# Patient Record
(demographics unavailable — no encounter records)

---

## 2024-11-19 NOTE — PLAN
[FreeTextEntry1] : 73 year old female w/ pmh of HLD, osteooperosis, presents for annual visit.  She has no complaints today.  She feels well. Was diagnosed with hld last year . She has not been following any particular diet since that time.    Annual visit  >>up to date with her mammo, dexa , colonoscopy, pap >> blood work and EKG reviewed  HLD >>elevated LDL persistent; has not been following any particular diet.  >>pateint wants to hear reccomendations from her Primary doctor which is Dr. Henriquez.  HTN >>uncontrolled HTN >>advised patient to either increase her amlopdine or  start Dual therapy anti-hypertensive regimen. >>Patient wants  to hear reccomendations from her primary doctor    Will defer follow-up  to Dr. Henriquez whom will decide when patient will follow up for repeat lipid panel and hypertension.

## 2024-11-19 NOTE — HISTORY OF PRESENT ILLNESS
[FreeTextEntry1] : annual visit [de-identified] : 73 year old female w/ pmh of HLD, osteooperosis, presents for annual visit.  She has no complaints today.  She feels well. Was diagnosed with hld last year . She has not been following any particular diet since that time.  ROS is negative.

## 2024-11-19 NOTE — HISTORY OF PRESENT ILLNESS
[FreeTextEntry1] : annual visit [de-identified] : 73 year old female w/ pmh of HLD, osteooperosis, presents for annual visit.  She has no complaints today.  She feels well. Was diagnosed with hld last year . She has not been following any particular diet since that time.  ROS is negative.

## 2024-11-19 NOTE — HEALTH RISK ASSESSMENT
[Very Good] : ~his/her~ current health as very good [Excellent] : ~his/her~  mood as  excellent [Yes] : Yes [2 - 4 times a month (2 pts)] : 2-4 times a month (2 points) [1 or 2 (0 pts)] : 1 or 2 (0 points) [Never (0 pts)] : Never (0 points) [No] : In the past 12 months have you used drugs other than those required for medical reasons? No [No falls in past year] : Patient reported no falls in the past year [0] : 2) Feeling down, depressed, or hopeless: Not at all (0) [PHQ-2 Negative - No further assessment needed] : PHQ-2 Negative - No further assessment needed [PHQ-9 Negative - No further assessment needed] : PHQ-9 Negative - No further assessment needed [Never] : Never [HIV test declined] : HIV test declined [Hepatitis C test declined] : Hepatitis C test declined [None] : None [With Significant Other] : lives with significant other [Retired] : retired [] :  [# Of Children ___] : has [unfilled] children [Sexually Active] : sexually active [Feels Safe at Home] : Feels safe at home [Fully functional (bathing, dressing, toileting, transferring, walking, feeding)] : Fully functional (bathing, dressing, toileting, transferring, walking, feeding) [Fully functional (using the telephone, shopping, preparing meals, housekeeping, doing laundry, using] : Fully functional and needs no help or supervision to perform IADLs (using the telephone, shopping, preparing meals, housekeeping, doing laundry, using transportation, managing medications and managing finances) [Reports normal functional visual acuity (ie: able to read med bottle)] : Reports normal functional visual acuity [Smoke Detector] : smoke detector [Carbon Monoxide Detector] : carbon monoxide detector [Seat Belt] :  uses seat belt [Sunscreen] : uses sunscreen [Graduate School] : graduate school [Audit-CScore] : 2 [de-identified] : tennis   [de-identified] : no particular diet; oatmeal breakfast, salad, pizza, sandwichs, dinner chicken roasted , bakedw/ rice , pasta, vegetables roasted [AZO2Ilccp] : 0 [Change in mental status noted] : No change in mental status noted [Language] : denies difficulty with language [Behavior] : denies difficulty with behavior [Learning/Retaining New Information] : denies difficulty learning/retaining new information [Handling Complex Tasks] : denies difficulty handling complex tasks [Reasoning] : denies difficulty with reasoning [Spatial Ability and Orientation] : denies difficulty with spatial ability and orientation [High Risk Behavior] : no high risk behavior [Reports changes in hearing] : Reports no changes in hearing [Reports changes in vision] : Reports no changes in vision [Reports changes in dental health] : Reports no changes in dental health [MammogramDate] : 11/2024 [PapSmearDate] : 10/2024 [PapSmearComments] : no pap smear at ob [BoneDensityDate] : 2024 [ColonoscopyDate] : 2000 [FreeTextEntry2] : teacher, [employer]

## 2024-11-19 NOTE — REVIEW OF SYSTEMS
[Patient Intake Form Reviewed] : Patient intake form was reviewed [FreeTextEntry1] : ros negative other then what is in HPI

## 2024-11-19 NOTE — HEALTH RISK ASSESSMENT
[Very Good] : ~his/her~ current health as very good [Excellent] : ~his/her~  mood as  excellent [Yes] : Yes [2 - 4 times a month (2 pts)] : 2-4 times a month (2 points) [1 or 2 (0 pts)] : 1 or 2 (0 points) [Never (0 pts)] : Never (0 points) [No] : In the past 12 months have you used drugs other than those required for medical reasons? No [No falls in past year] : Patient reported no falls in the past year [0] : 2) Feeling down, depressed, or hopeless: Not at all (0) [PHQ-2 Negative - No further assessment needed] : PHQ-2 Negative - No further assessment needed [PHQ-9 Negative - No further assessment needed] : PHQ-9 Negative - No further assessment needed [Never] : Never [HIV test declined] : HIV test declined [Hepatitis C test declined] : Hepatitis C test declined [None] : None [With Significant Other] : lives with significant other [Retired] : retired [] :  [# Of Children ___] : has [unfilled] children [Sexually Active] : sexually active [Feels Safe at Home] : Feels safe at home [Fully functional (bathing, dressing, toileting, transferring, walking, feeding)] : Fully functional (bathing, dressing, toileting, transferring, walking, feeding) [Fully functional (using the telephone, shopping, preparing meals, housekeeping, doing laundry, using] : Fully functional and needs no help or supervision to perform IADLs (using the telephone, shopping, preparing meals, housekeeping, doing laundry, using transportation, managing medications and managing finances) [Reports normal functional visual acuity (ie: able to read med bottle)] : Reports normal functional visual acuity [Smoke Detector] : smoke detector [Carbon Monoxide Detector] : carbon monoxide detector [Seat Belt] :  uses seat belt [Sunscreen] : uses sunscreen [Graduate School] : graduate school [Audit-CScore] : 2 [de-identified] : tennis   [de-identified] : no particular diet; oatmeal breakfast, salad, pizza, sandwichs, dinner chicken roasted , bakedw/ rice , pasta, vegetables roasted [[id number]] : 0 [Change in mental status noted] : No change in mental status noted [Language] : denies difficulty with language [Behavior] : denies difficulty with behavior [Learning/Retaining New Information] : denies difficulty learning/retaining new information [Handling Complex Tasks] : denies difficulty handling complex tasks [Reasoning] : denies difficulty with reasoning [Spatial Ability and Orientation] : denies difficulty with spatial ability and orientation [High Risk Behavior] : no high risk behavior [Reports changes in hearing] : Reports no changes in hearing [Reports changes in vision] : Reports no changes in vision [Reports changes in dental health] : Reports no changes in dental health [MammogramDate] : 11/2024 [PapSmearDate] : 10/2024 [PapSmearComments] : no pap smear at ob [BoneDensityDate] : 2024 [ColonoscopyDate] : 2000 [FreeTextEntry2] : teacher, [employer]

## 2024-11-19 NOTE — PHYSICAL EXAM
[TextEntry] :  HEENT:   pupils equal and reactive, EOMI, no oropharyngeal lesions, erythema, exudates, oral thrush  NECK:   supple, no carotid bruits, no palpable lymph nodes, no thyromegaly  CV:  +S1, +S2, regular, no murmurs or rubs  RESP:   lungs clear to auscultation bilaterally, no wheezing, rales, rhonchi, good air entry bilaterally  BREAST:  not examined  GI:  abdomen soft, non-tender, non-distended, normal BS, no bruits, no abdominal masses, no palpable masses  RECTAL:  not examined  :  not examined  MSK:   normal muscle tone, no atrophy, no rigidity, no contractions  EXT:   no clubbing, no cyanosis, no edema, no calf pain, swelling or erythema  VASCULAR:  pulses equal and symmetric in the upper and lower extremities  NEURO:  AAOX3, no focal neurological deficits, follows all commands, able to move extremities spontaneously  SKIN:  no ulcers, lesions or rashes

## 2025-04-25 NOTE — PHYSICAL EXAM
[Antalgic] : antalgic [LE] : Sensory: Intact in bilateral lower extremities [DP] : dorsalis pedis 2+ and symmetric bilaterally [Normal RLE] : Right Lower Extremity: No scars, rashes, lesions, ulcers, skin intact [Normal LLE] : Left Lower Extremity: No scars, rashes, lesions, ulcers, skin intact [Normal Touch] : sensation intact for touch [Normal] : no peripheral adenopathy appreciated [de-identified] : On physical exam of the left hip skin is warm and dry without erythema ecchymosis signs or symptoms of infection superficial or deep.  There is tenderness noted at the groin.  Range of motion is 90 degrees of flexion, 10 degrees of external rotation with pain, 5 degrees of internal rotation.  Strength is maintained in all planes.  Sensation is intact throughout the distal lower extremity.  There are 2+ dorsalis pedis pulse.  Leg lengths appear 1 cm shorter on the left than the right when measured at the medial malleolus. Negative Maria C's exam  Left Knee: Skin is clean, dry, intact. Swelling: No significant swelling Effusion: No significant effusion Alignment: Neutral alignment Tenderness: Medial joint line Incision: No visible incisions Skin Temp: Warm to touch ROM: Flexion: 135 deg.; Extension: 0 deg, Laxity A-P: Negative anteroposterior drawer Laxity M-L: Less than 5 degree laxity varus valgus stresses PF crepitus: None Sensation intact throughout the distal lower extremity Pulses: 2+ dorsalis pedis pulses Quad/Ham St: 5/5 [de-identified] : Two radiographs of the pelvis and left hip were performed today in the [place] office. KL grade 4 bone-on-bone cartilaginous wear in the F- A joint. Subchondral sclerosis noted on both sides the F-A  joint. Superior and inferior rim osteophytes are noted.   3 views, AP, lateral, and sunrise radiographs of the Right knee were performed today in the [place] office. KL grade 3 cartilaginous wear in medial compartment.

## 2025-04-25 NOTE — PHYSICAL EXAM
[Antalgic] : antalgic [LE] : Sensory: Intact in bilateral lower extremities [DP] : dorsalis pedis 2+ and symmetric bilaterally [Normal RLE] : Right Lower Extremity: No scars, rashes, lesions, ulcers, skin intact [Normal LLE] : Left Lower Extremity: No scars, rashes, lesions, ulcers, skin intact [Normal Touch] : sensation intact for touch [Normal] : no peripheral adenopathy appreciated [de-identified] : On physical exam of the left hip skin is warm and dry without erythema ecchymosis signs or symptoms of infection superficial or deep.  There is tenderness noted at the groin.  Range of motion is 90 degrees of flexion, 10 degrees of external rotation with pain, 5 degrees of internal rotation.  Strength is maintained in all planes.  Sensation is intact throughout the distal lower extremity.  There are 2+ dorsalis pedis pulse.  Leg lengths appear 1 cm shorter on the left than the right when measured at the medial malleolus. Negative Maria C's exam  Left Knee: Skin is clean, dry, intact. Swelling: No significant swelling Effusion: No significant effusion Alignment: Neutral alignment Tenderness: Medial joint line Incision: No visible incisions Skin Temp: Warm to touch ROM: Flexion: 135 deg.; Extension: 0 deg, Laxity A-P: Negative anteroposterior drawer Laxity M-L: Less than 5 degree laxity varus valgus stresses PF crepitus: None Sensation intact throughout the distal lower extremity Pulses: 2+ dorsalis pedis pulses Quad/Ham St: 5/5 [de-identified] : Two radiographs of the pelvis and left hip were performed today in the [place] office. KL grade 4 bone-on-bone cartilaginous wear in the F- A joint. Subchondral sclerosis noted on both sides the F-A  joint. Superior and inferior rim osteophytes are noted.   3 views, AP, lateral, and sunrise radiographs of the Right knee were performed today in the [place] office. KL grade 3 cartilaginous wear in medial compartment.

## 2025-04-25 NOTE — HISTORY OF PRESENT ILLNESS
[Worsening] : worsening [6] : a current pain level of 6/10 [10] : a maximum pain level of 10/10 [Standing] : standing [Intermit.] : ~He/She~ states the symptoms seem to be intermittent [Bending] : worsened by bending [Hip Movement] : worsened by hip movement [Walking] : worsened by walking [de-identified] : 74-year-old female presents for follow-up of the left hip and the right knee.  She states she is continuing to have pain in both the left hip and the right knee however her left hip is worsening and causing a lot of discomfort.  She is no longer able to participate in activities and she has difficulty with putting her socks and shoes on.  Pain can be rated anywhere between a 6-10 out of 10 depending on her activities.  She has had 2 ultrasound-guided intra articular injections into the left hip without any relief.  In regards to the right knee she is having some discomfort and has tried physical therapy and multiple rounds of cortisone as well as gel injections in the past 2 years by another orthopedist with no relief in symptoms.  She states she does feel as though the pain that she is feeling on the right is exacerbated by how painful her left hip is because she is putting more pressure on the right side.  She did have an MRI in 2024 which showed some mild arthritis with a new tear in her medial meniscus. Denies fevers, chills, chest pain, calf pain, shortness of breath.

## 2025-04-25 NOTE — END OF VISIT
[FreeTextEntry3] : I, Dr. Haynes, personally performed the evaluation and management (E/M) services for this established patient who presents today with an exacerbation of an existing condition. That E/M includes conducting the clinically appropriate interval history &/or exam, assessing all new/exacerbated conditions, and establishing a new plan of care. Today, my PAULETTE, Lux Markham PA-C, was here to observe my evaluation and management service for this new problem/exacerbated condition and follow the plan of care established by me going forward.

## 2025-04-25 NOTE — HISTORY OF PRESENT ILLNESS
[Worsening] : worsening [6] : a current pain level of 6/10 [10] : a maximum pain level of 10/10 [Standing] : standing [Intermit.] : ~He/She~ states the symptoms seem to be intermittent [Bending] : worsened by bending [Hip Movement] : worsened by hip movement [Walking] : worsened by walking [de-identified] : 74-year-old female presents for follow-up of the left hip and the right knee.  She states she is continuing to have pain in both the left hip and the right knee however her left hip is worsening and causing a lot of discomfort.  She is no longer able to participate in activities and she has difficulty with putting her socks and shoes on.  Pain can be rated anywhere between a 6-10 out of 10 depending on her activities.  She has had 2 ultrasound-guided intra articular injections into the left hip without any relief.  In regards to the right knee she is having some discomfort and has tried physical therapy and multiple rounds of cortisone as well as gel injections in the past 2 years by another orthopedist with no relief in symptoms.  She states she does feel as though the pain that she is feeling on the right is exacerbated by how painful her left hip is because she is putting more pressure on the right side.  She did have an MRI in 2024 which showed some mild arthritis with a new tear in her medial meniscus. Denies fevers, chills, chest pain, calf pain, shortness of breath.

## 2025-04-25 NOTE — DISCUSSION/SUMMARY
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 Based upon the patients continued symptoms and failure to respond to 3 months of conservative treatment I have recommended a Left Total Hip Replacement for this patient. A long discussion took place with the patient describing what a total joint replacement is and what the procedure would entail. A Total Hip model, similar to the implant that will be used during the operation was utilized to demonstrate and to discuss the various bearing surfaces of the implants.   The benefits of surgery were discussed with the patient including the potential for improving their current clinical condition through operative intervention. Alternatives to surgical intervention including continued conservative management were also discussed in detail.   The hospitalization and post-operative care and rehabilitation were also discussed. The use of perioperative antibiotics and DVT prophylaxis were discussed. The risk, benefits and alternatives to a surgical intervention were discussed at length with the patient. The patient was also advised of risks related to the medical comorbidities and elevated body mass index (BMI). A lengthy discussion took place to review the most common complications including but not limited to: deep vein thrombosis, pulmonary embolus, heart attack, stroke, infection, wound breakdown, numbness, damage to nerves, tendon, muscles, arteries or other blood vessels, death and other possible complications from anesthesia. The patient was told that we will take steps to minimize these risks by using sterile technique, antibiotics and DVT prophylaxis when appropriate and follow the patient postoperatively in the office setting to monitor progress. The possibility of recurrent pain, no improvement in pain and actual worsening of pain were also discussed with the patient.   The nuances between posterior approach and anterior approach THR were reviewed in detail. Considering his clinical exam and X-Ray findings he does meet inclusion criteria for benefiting from ASI or Anterior Approach THR. The patient was advised that this is Dr. Haynes's primary surgical approach for THR unless contraindicated by above. Specific ASI complications especially vascular injury, neurologic injury-lateral femoral cutaneous nerve, and proximal femur fracture were reviewed in detail.   The need for 4 weeks of Anterior THR dislocation precautions with activity and the risk of THR implant dislocation at any point Post-Op was reviewed in detail.   The patient was advised of the goals, alternatives, risks and benefits of a 3 week course of Celebrex 200 mg BID utilized by Dr. Haynes in their practice to prevent Heterotopic Ossification seen in patients post total hip arthroplasty. If this is medically contraindicated the alternative would be a single dose (800cGy) radiation treatment to the hip implant site performed pre-operatively. A consultation with the Radiation Oncologist at [facility] will then be required.   The discharge plan of care focused on the patient going home following surgery. I encouraged the patient to make the necessary arrangements to have someone stay with them when they are discharged home. Following discharge, a home care nurse will visit the patient. They will open your home care case and request home physical therapy services. Home physical therapy   will commence following discharge provided it is appropriate and covered by their health insurance benefit plan.   All questions were answered to the satisfaction of the patient. The treatment plan of care, as well as a model of a total Hip equivalent to the one that will be used for their total joint replacement, was shared with the patient.   A DJO implant with Ceramic on Poly bearing surfaces is the implant I feel comfortable utilizing in my Primary THRs and the implant I am planning for their THR. If the patient wishes to utilize a different implant brand/type for their THR, they may obtain an additional surgical opinion from a Surgeon utilizing that brand implant.   The patient agreed to the plan of care as well as the use of implants in their total joint replacement.   The patient was advised that they will require a medical preoperative risk evaluation by their PCP. Further medical subspecialty clearances such as cardiac may be indicated if felt needed by their PCP.   The patient participated in an interactive discussion of the THR implant planned for their surgery with questions answered, agreed with the treatment plan, and has decided to move forward with elective THR as planned.

## 2025-04-25 NOTE — REASON FOR VISIT
[Follow-Up Visit] : a follow-up visit for [Family Member] : family member [FreeTextEntry2] : left hip arthritis

## 2025-04-25 NOTE — DISCUSSION/SUMMARY
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 Based upon the patients continued symptoms and failure to respond to 3 months of conservative treatment I have recommended a Left Total Hip Replacement for this patient. A long discussion took place with the patient describing what a total joint replacement is and what the procedure would entail. A Total Hip model, similar to the implant that will be used during the operation was utilized to demonstrate and to discuss the various bearing surfaces of the implants.   The benefits of surgery were discussed with the patient including the potential for improving their current clinical condition through operative intervention. Alternatives to surgical intervention including continued conservative management were also discussed in detail.   The hospitalization and post-operative care and rehabilitation were also discussed. The use of perioperative antibiotics and DVT prophylaxis were discussed. The risk, benefits and alternatives to a surgical intervention were discussed at length with the patient. The patient was also advised of risks related to the medical comorbidities and elevated body mass index (BMI). A lengthy discussion took place to review the most common complications including but not limited to: deep vein thrombosis, pulmonary embolus, heart attack, stroke, infection, wound breakdown, numbness, damage to nerves, tendon, muscles, arteries or other blood vessels, death and other possible complications from anesthesia. The patient was told that we will take steps to minimize these risks by using sterile technique, antibiotics and DVT prophylaxis when appropriate and follow the patient postoperatively in the office setting to monitor progress. The possibility of recurrent pain, no improvement in pain and actual worsening of pain were also discussed with the patient.   The nuances between posterior approach and anterior approach THR were reviewed in detail. Considering his clinical exam and X-Ray findings he does meet inclusion criteria for benefiting from ASI or Anterior Approach THR. The patient was advised that this is Dr. Haynes's primary surgical approach for THR unless contraindicated by above. Specific ASI complications especially vascular injury, neurologic injury-lateral femoral cutaneous nerve, and proximal femur fracture were reviewed in detail.   The need for 4 weeks of Anterior THR dislocation precautions with activity and the risk of THR implant dislocation at any point Post-Op was reviewed in detail.   The patient was advised of the goals, alternatives, risks and benefits of a 3 week course of Celebrex 200 mg BID utilized by Dr. Haynes in their practice to prevent Heterotopic Ossification seen in patients post total hip arthroplasty. If this is medically contraindicated the alternative would be a single dose (800cGy) radiation treatment to the hip implant site performed pre-operatively. A consultation with the Radiation Oncologist at [facility] will then be required.   The discharge plan of care focused on the patient going home following surgery. I encouraged the patient to make the necessary arrangements to have someone stay with them when they are discharged home. Following discharge, a home care nurse will visit the patient. They will open your home care case and request home physical therapy services. Home physical therapy   will commence following discharge provided it is appropriate and covered by their health insurance benefit plan.   All questions were answered to the satisfaction of the patient. The treatment plan of care, as well as a model of a total Hip equivalent to the one that will be used for their total joint replacement, was shared with the patient.   A DJO implant with Ceramic on Poly bearing surfaces is the implant I feel comfortable utilizing in my Primary THRs and the implant I am planning for their THR. If the patient wishes to utilize a different implant brand/type for their THR, they may obtain an additional surgical opinion from a Surgeon utilizing that brand implant.   The patient agreed to the plan of care as well as the use of implants in their total joint replacement.   The patient was advised that they will require a medical preoperative risk evaluation by their PCP. Further medical subspecialty clearances such as cardiac may be indicated if felt needed by their PCP.   The patient participated in an interactive discussion of the THR implant planned for their surgery with questions answered, agreed with the treatment plan, and has decided to move forward with elective THR as planned.

## 2025-05-15 NOTE — PHYSICAL EXAM
[de-identified] : Patient is well nourished, well-developed, in no acute distress, with appropriate mood and affect. The patient is oriented to time, place, and person. Gait evaluation reveals a limp. There is no inguinal adenopathy. Examination of the contralateral hip shows normal range of motion, strength, no tenderness, and intact skin. The affected limb is well-perfused, shows a grossly normal motor and sensory examination. Examination of the hip shows no skin lesions. Hip motion is reduced and causes pain. Leg lengths are approximately short on the left by 1cm. Stinchfield test is positive. Both hips are stable and muscle strength is normal. Pedal pulses are palpable.  [de-identified] : AP and lateral x-rays of the left hip, pelvis, and femur were brought in by the patient which i reviewed and demonstrate degenerative joint disease of the hip with joint space narrowing, osteophyte formation, and subchondral sclerosis.

## 2025-05-15 NOTE — HISTORY OF PRESENT ILLNESS
[de-identified] : This is very nice 74 year-old woman experiencing left hip and groin and thigh pain, which is severe in intensity. She has known left hip osteoarthritis. Here for a 2nd opinion. The pain substantially limits activities of daily living. Walking tolerance is reduced. Medication including L hip CSI and PT and activity modification have been minimally effective for a period lasting greater than three months in duration. Assistive devices and external support were not deemed by the patient to be helpful in improving their function. Due to the severity of osteoarthritis and level of pain, physical therapy is contraindicated. Pain and restriction of function are intolerable at this time.

## 2025-05-15 NOTE — DISCUSSION/SUMMARY
[de-identified] : This patient has severe left hip osteoarthritis. She has tried and failed a course of conservative management and would like to proceed with a direct anterior approach left total hip arthroplasty.    The patient is an appropriate candidate for consideration of left total knee replacement. An extensive discussion was conducted of the natural history of the disease and the variety of surgical and non-surgical treatment options available to the patient. A risk/benefit analysis was discussed with the patient reviewing the advantages and disadvantages of surgical intervention at this time. Both the level and length of the patient's pain have made additional conservative treatment measures consisting of NSAIDs, physical therapy, corticosteroids, and/or viscosupplementation contraindicated. A full explanation was given of the nature and the purpose of the procedure and anesthesia, its benefits, possible alternative methods of diagnosis or treatment, the risks involved, the possibility of complications, the foreseeable consequences of the procedure and the possible results of the non-treatment. I reviewed the plan of care as well as used a model of a total knee implant equivalent to the one that will be used for their total knee joint replacement. The ability to secure the implant utilizing cement or cementless (press-fit) was discussed with the patient. The patient agrees with the plan of care, as well as the use of implants for their total knee replacement.   We also discussed that if robotic/computer navigation is utilized, then additional incisions may need to be made to accommodate the computer navigation arrays, which will be placed in the femur and tibia.  No guarantee or assurance was made as to the results that may be obtained. Specifically, the risks were identified to include, but are not limited to the following: Infection, phlebitis, pulmonary embolism, death, paralysis, dislocation, pain, stiffness, instability, limp, weakness, breakage, leg-length inequality, uncontrolled bleeding, nerve injury, blood vessel injury, pressure sores, anesthetic risks, delayed healing of wound and bone, and wear and loosening. Further discussion was undertaken with the patient about the details of surgical preparation, treatment, and postoperative rehabilitation including medical clearance, autotransfusion, the hospital course, and the postoperative rehabilitation involved. All in all, I feel that this patient is a good candidate for surgical reconstruction.  The patient and I discussed the option for 23 hour stay joint replacement. They will stay overnight in the hospital after surgery and will discharge home on the next day of surgery. The risks and benefits of this type of rapid recovery protocol was reviewed with the patient and they are in agreement with proceeding with 23 hour stay joint replacement surgery. They understand that in the event of an emergency, that they will be transferred to the main hospital for inpatient care.

## 2025-06-13 NOTE — HISTORY OF PRESENT ILLNESS
[FreeTextEntry1] : Cough upper respiratory infection symptoms [de-identified] : 74-year-old female presents to clinic for 3 days of upper respiratory infection symptoms, cough.  She also has congestion in her throat sinuses.  Patient has been taking care of her grandchildren.  Monday she felt some throat pain, went to her ENT who evaluated her and the exam at that time was benign.  Wednesday morning she started to develop upper respiratory infection symptoms throat pain some mild fatigue.  No fevers or chills.  Cough is mainly dry.

## 2025-06-20 NOTE — PHYSICAL EXAM
[Alert] : alert [Well Nourished] : well nourished [No Acute Distress] : no acute distress [Well Developed] : well developed [Normal Sclera/Conjunctiva] : normal sclera/conjunctiva [EOMI] : extra ocular movement intact [No Proptosis] : no proptosis [Thyroid Not Enlarged] : the thyroid was not enlarged [No Thyroid Nodules] : no palpable thyroid nodules [Clear to Auscultation] : lungs were clear to auscultation bilaterally [Normal S1, S2] : normal S1 and S2 [Normal Rate] : heart rate was normal [Regular Rhythm] : with a regular rhythm [No Edema] : no peripheral edema [Normal Bowel Sounds] : normal bowel sounds [Not Tender] : non-tender [Not Distended] : not distended [Soft] : abdomen soft [Normal Anterior Cervical Nodes] : no anterior cervical lymphadenopathy [No Spinal Tenderness] : no spinal tenderness [Kyphosis] : kyphosis present [No Stigmata of Cushings Syndrome] : no stigmata of Cushings Syndrome [Normal Gait] : normal gait [Normal Reflexes] : deep tendon reflexes were 2+ and symmetric [No Tremors] : no tremors [Oriented x3] : oriented to person, place, and time [No Murmurs] : no murmurs [de-identified] : 1 finger width to pelvic height

## 2025-06-20 NOTE — HISTORY OF PRESENT ILLNESS
[FreeTextEntry1] :  Pt returns for a follow-up visit for osteoporosis. No interval health changes. No major surgeries, hospitalizations, fractures or changes in medication. Up to date with dentist. Has TMJ, getting mold. No major dental work planned.  Still reports arthritic pain in right knee and left hip, will follow up with surgeon. Patient reports worsening kyphosis, however height is stable. Planned for Left Hip replacement 9/3/25 with Dane Haynes MD  Told of low bone density approximately 10 years ago. She took Actonel for 8 years and Reclast for 2 years. No interval fx.  BMD 01/2017 indicated decrease in the spine while on drug holiday for 4 years (prior dose of Reclast was January 2013).  Had Reclast 3/2017. Tolerated well no fx. BMD 2018 indicated decrease in proximal radius. Began Prolia Feb 2018. Tolerating well. No thigh pain, no interval fx. Repeat bone density 2019 improved.Repeat bone density 2025 improved in the spine   Had gel injections into the knees. Drinks alcohol socially (one drink 3x/month). No history of steroid use. No history of hyper or hypothyroidism

## 2025-06-20 NOTE — PHYSICAL EXAM
[Alert] : alert [Well Nourished] : well nourished [No Acute Distress] : no acute distress [Well Developed] : well developed [Normal Sclera/Conjunctiva] : normal sclera/conjunctiva [EOMI] : extra ocular movement intact [No Proptosis] : no proptosis [Thyroid Not Enlarged] : the thyroid was not enlarged [No Thyroid Nodules] : no palpable thyroid nodules [Clear to Auscultation] : lungs were clear to auscultation bilaterally [Normal S1, S2] : normal S1 and S2 [Normal Rate] : heart rate was normal [Regular Rhythm] : with a regular rhythm [No Edema] : no peripheral edema [Normal Bowel Sounds] : normal bowel sounds [Not Tender] : non-tender [Not Distended] : not distended [Soft] : abdomen soft [Normal Anterior Cervical Nodes] : no anterior cervical lymphadenopathy [No Spinal Tenderness] : no spinal tenderness [Kyphosis] : kyphosis present [No Stigmata of Cushings Syndrome] : no stigmata of Cushings Syndrome [Normal Gait] : normal gait [Normal Reflexes] : deep tendon reflexes were 2+ and symmetric [No Tremors] : no tremors [Oriented x3] : oriented to person, place, and time [No Murmurs] : no murmurs [de-identified] : 1 finger width to pelvic height

## 2025-06-20 NOTE — PROCEDURE
[FreeTextEntry1] : Bone Mineral Density: 06/19/2025 Indication: vs 2023 to assess response to medication Spine:  -1.8  osteopenia  ( +11.3 %), significant change Total hip:  -1.5   ( 0%), no significant change Femoral neck:  normal  -0.6  ( 0%), no significant change Right Total hip:  osteopenia -1.5   ( -0.6%), significant change Femoral neck: osteopenia  -1.7  ( -0.9%), no significant change Proximal radius: -2.0  osteopenia (  0%), no significant change TBS 1.246 partially degraded   Bone Mineral Density: 06/14/2023  Indication: Compared to 2021 Spine -2.3 osteopenia no significant change Total hip -1.5 osteopenia no significant change Femoral neck -0.6 normal +3.0% possibly due to arthritis femoral head Right total hip -1.4 osteopenia -3.6% although no significant difference versus 2019 Right femoral neck -1.6 osteopenia no significant change Proximal radius -2.0 osteopenia no significant change  Bone mineral density: 05/05/2021  Indication: vs. 2019 Spine: -2.4 osteopenia, no significant change R Total hip: -1.2 osteopenia, no significant change, L -1.6 osteopenia, no significant change R Femoral neck: -1.7 osteopenia, no significant change, L -1.1 osteopenia, not accurate due to arthritis, no significant change Proximal radius: -2.3 osteopenia, no significant change  Bone mineral density 3/6/19 indication: vs 2018 assess response to medication  spine -2.3 osteopenia  +4.1% total hip-1.6 osteopenia no significant change  femoral neck -1.2 osteopenia +7.3% proximal radius -2.2 osteopenia +6.6%  Bone mineral density test January 19, 2018 Indication compared to 2017, prior test showed bone loss Spine -2.6, osteoporosis, no significant Total hip -1.6, osteopenia, no significant change Femoral neck -1.6, osteopenia, no significant change Proximal radius -2.8, osteoporosis, -5.8%  Bone mineral density January 4, 2017 Spine -2.6, osteoporosis, -3% versus 2016 Total hip -1.7, osteopenia, no significant change Femoral neck -1.6, osteopenia, no significant change Proximal radius -2.2, osteopenia, no significant change

## 2025-06-20 NOTE — ASSESSMENT
[Denosumab Therapy] : Risks  and benefits of denosumab therapy were discussed with the patient including eczema, cellulitis, osteonecrosis of the jaw and atypical femur fractures [FreeTextEntry1] : 73 y/o female with osteoporosis  Pt previously treated with Actonel and Reclast until 2013. While on Drug holiday BMD indicated decrease in spine density. Pt had another dose of Reclast but bone density Jan 2018 showed decrease radius despite Reclast.  The patient was advised that she is at increased risk for future fractures due to osteoporosis.  Began Prolia Feb 2018. Tolerating well. No thigh pain, no interval fx. No ONJ. BMD 4/2021 indicated stable osteopenia at all sites. BMD 2023 essentially stable. BMD 2025 Bone density increased in spine and other sites stable  Had latest dose of Prolia in [place] on 12/2024. Geting dose today  Continue Prolia, buy and bill.  Kyphosis, stable on physical examination.  F/u in 12 months - Patient will get December 2025 dose of Prolia in [place], will discuss possible transitioning to Reclast at the next visit in 12 months.